# Patient Record
Sex: MALE | Race: WHITE | NOT HISPANIC OR LATINO | Employment: FULL TIME | URBAN - METROPOLITAN AREA
[De-identification: names, ages, dates, MRNs, and addresses within clinical notes are randomized per-mention and may not be internally consistent; named-entity substitution may affect disease eponyms.]

---

## 2023-11-03 DIAGNOSIS — M79.645 THUMB PAIN, LEFT: ICD-10-CM

## 2023-11-06 PROBLEM — R36.9 PENILE DISCHARGE: Status: ACTIVE | Noted: 2023-11-06

## 2023-11-06 PROBLEM — M25.562 LEFT KNEE PAIN: Status: ACTIVE | Noted: 2023-11-06

## 2023-11-07 ENCOUNTER — ANCILLARY PROCEDURE (OUTPATIENT)
Dept: RADIOLOGY | Facility: CLINIC | Age: 28
End: 2023-11-07
Payer: COMMERCIAL

## 2023-11-07 ENCOUNTER — OFFICE VISIT (OUTPATIENT)
Dept: ORTHOPEDIC SURGERY | Facility: CLINIC | Age: 28
End: 2023-11-07
Payer: COMMERCIAL

## 2023-11-07 VITALS — BODY MASS INDEX: 22.46 KG/M2 | WEIGHT: 175 LBS | HEIGHT: 74 IN

## 2023-11-07 DIAGNOSIS — M79.645 THUMB PAIN, LEFT: ICD-10-CM

## 2023-11-07 DIAGNOSIS — S69.92XD THUMB INJURY, LEFT, SUBSEQUENT ENCOUNTER: ICD-10-CM

## 2023-11-07 PROCEDURE — 1036F TOBACCO NON-USER: CPT | Performed by: ORTHOPAEDIC SURGERY

## 2023-11-07 PROCEDURE — 73140 X-RAY EXAM OF FINGER(S): CPT | Mod: LEFT SIDE | Performed by: RADIOLOGY

## 2023-11-07 PROCEDURE — 73140 X-RAY EXAM OF FINGER(S): CPT | Mod: LT

## 2023-11-07 PROCEDURE — 99203 OFFICE O/P NEW LOW 30 MIN: CPT | Performed by: ORTHOPAEDIC SURGERY

## 2023-11-07 ASSESSMENT — PAIN - FUNCTIONAL ASSESSMENT: PAIN_FUNCTIONAL_ASSESSMENT: NO/DENIES PAIN

## 2023-11-08 NOTE — PROGRESS NOTES
History of Present Illness:  Chief Complaint   Patient presents with    Left Hand - Pain     Lt thumb pain- started end of August after a rugby ball hit thumb during a match. Patient continued to play out the season. In September decided to have the injury evaluated at [facility], had x-rays done that showed bone erosion. Experiences pain with certain movements and has a lack of ROM       Patient presents today for evaluation of persistent left thumb pain that began in August 2023.  He had swelling about the MCP joint and has also noticed decreased motion into his left thumb with associated pain.  Pain is worse with gripping and pinching.  Somewhat better with rest.    History reviewed. No pertinent past medical history.    Medication Documentation Review Audit       Reviewed by Lora De La Vega CMA (Medical Assistant) on 11/07/23 at 0919      Medication Order Taking? Sig Documenting Provider Last Dose Status            No Medications to Display                                   No Known Allergies    Social History     Socioeconomic History    Marital status: Single     Spouse name: Not on file    Number of children: Not on file    Years of education: Not on file    Highest education level: Not on file   Occupational History    Not on file   Tobacco Use    Smoking status: Never    Smokeless tobacco: Never   Vaping Use    Vaping Use: Never used   Substance and Sexual Activity    Alcohol use: Yes     Alcohol/week: 6.0 standard drinks of alcohol     Types: 3 Cans of beer, 3 Shots of liquor per week    Drug use: Never    Sexual activity: Defer   Other Topics Concern    Not on file   Social History Narrative    Not on file     Social Determinants of Health     Financial Resource Strain: Not on file   Food Insecurity: Not on file   Transportation Needs: Not on file   Physical Activity: Not on file   Stress: Not on file   Social Connections: Not on file   Intimate Partner Violence: Not on file   Housing Stability:  Not on file       History reviewed. No pertinent surgical history.     Review of Systems   GENERAL: Negative for malaise, significant weight loss, fever  MUSCULOSKELETAL: see HPI  NEURO:  Negative     Physical Examination  Constitutional: Appears well-developed and well-nourished.  Head: Normocephalic and atraumatic.  Eyes: EOMI grossly  Cardiovascular: Intact distal pulses.   Respiratory: Effort normal. No respiratory distress.  Neurologic: Alert and oriented to person, place, and time.  Skin: Skin is warm and dry.  Hematologic / Lymphatic: No lymphedema, lymphangitis.  Psychiatric: normal mood and affect. Behavior is normal.   Musculoskeletal:  Left thumb: Mild edema about MCP joint compared to contralateral side.  5-45 degrees active flexion at MCP joint.  There is a firm endpoint with testing of radial and ulnar collateral ligaments, but pain with stressing of the radial collateral ligament as well as tenderness about radial aspect of MCP joint.  Sensation grossly intact throughout distally.  Capillary refill less than 2 seconds.    Radiographs: Left thumb radiographs ordered and available for my review/interpretation demonstrates slight ulnar deviation of the thumb at the MCP joint.  There is bony irregularity about the radial collateral ligament origin on the metacarpal head.     Assessment:  Patient with left thumb sprain with concern for radial collateral ligament injury     Plan:  Nature of the diagnosis was discussed with the patient.  Patient seems to have good stability and injury may be amenable to splinting to allow for additional healing.  We discussed potential for persistent symptoms despite conservative treatment and possible utility of operative intervention for radial collateral ligament stabilization.  Recommend use of thumb spica splint and left hand MRI has been ordered for further evaluation of collateral ligament status.  Plan for follow-up after MRI completion.

## 2024-01-11 ENCOUNTER — HOSPITAL ENCOUNTER (OUTPATIENT)
Dept: RADIOLOGY | Facility: CLINIC | Age: 29
Discharge: HOME | End: 2024-01-11
Payer: COMMERCIAL

## 2024-01-11 DIAGNOSIS — S69.92XD THUMB INJURY, LEFT, SUBSEQUENT ENCOUNTER: ICD-10-CM

## 2024-01-11 PROCEDURE — 73218 MRI UPPER EXTREMITY W/O DYE: CPT | Mod: LT

## 2024-01-11 PROCEDURE — 73218 MRI UPPER EXTREMITY W/O DYE: CPT | Mod: LEFT SIDE | Performed by: STUDENT IN AN ORGANIZED HEALTH CARE EDUCATION/TRAINING PROGRAM

## 2024-02-13 ENCOUNTER — OFFICE VISIT (OUTPATIENT)
Dept: ORTHOPEDIC SURGERY | Facility: CLINIC | Age: 29
End: 2024-02-13
Payer: COMMERCIAL

## 2024-02-13 DIAGNOSIS — S69.92XD THUMB INJURY, LEFT, SUBSEQUENT ENCOUNTER: Primary | ICD-10-CM

## 2024-02-13 PROCEDURE — 1036F TOBACCO NON-USER: CPT | Performed by: ORTHOPAEDIC SURGERY

## 2024-02-13 PROCEDURE — 99213 OFFICE O/P EST LOW 20 MIN: CPT | Performed by: ORTHOPAEDIC SURGERY

## 2024-02-13 ASSESSMENT — PAIN - FUNCTIONAL ASSESSMENT: PAIN_FUNCTIONAL_ASSESSMENT: NO/DENIES PAIN

## 2024-02-13 NOTE — PROGRESS NOTES
History of Present Illness:  Chief Complaint   Patient presents with    Left Hand - Follow-up     MRI review      Patient presents for repeat evaluation of persistent left thumb pain.  As previously noted, symptoms began in August 2023.  He continues to have intermittent soreness about the MCP joint that is worse with gripping and pinching as well as other heavier activities.  Symptoms are somewhat better with rest, but recur as soon as he becomes more active.  Recently completed MRI.      History reviewed. No pertinent past medical history.    Medication Documentation Review Audit       Reviewed by Lora De La Vega CMA (Medical Assistant) on 02/13/24 at 1139      Medication Order Taking? Sig Documenting Provider Last Dose Status            No Medications to Display                                   No Known Allergies    Social History     Socioeconomic History    Marital status: Single     Spouse name: Not on file    Number of children: Not on file    Years of education: Not on file    Highest education level: Not on file   Occupational History    Not on file   Tobacco Use    Smoking status: Never    Smokeless tobacco: Never   Vaping Use    Vaping Use: Never used   Substance and Sexual Activity    Alcohol use: Yes     Alcohol/week: 6.0 standard drinks of alcohol     Types: 3 Cans of beer, 3 Shots of liquor per week    Drug use: Never    Sexual activity: Defer   Other Topics Concern    Not on file   Social History Narrative    Not on file     Social Determinants of Health     Financial Resource Strain: Not on file   Food Insecurity: Not on file   Transportation Needs: Not on file   Physical Activity: Not on file   Stress: Not on file   Social Connections: Not on file   Intimate Partner Violence: Not on file   Housing Stability: Not on file       History reviewed. No pertinent surgical history.     Review of Systems   GENERAL: Negative for malaise, significant weight loss, fever  MUSCULOSKELETAL: see HPI  NEURO:   Negative     Physical Examination  Constitutional: Appears well-developed and well-nourished.  Head: Normocephalic and atraumatic.  Eyes: EOMI grossly  Cardiovascular: Intact distal pulses.   Respiratory: Effort normal. No respiratory distress.  Neurologic: Alert and oriented to person, place, and time.  Skin: Skin is warm and dry.  Hematologic / Lymphatic: No lymphedema, lymphangitis.  Psychiatric: normal mood and affect. Behavior is normal.   Musculoskeletal:  Left thumb: Mild edema about MCP joint compared to contralateral side.  5-45 degrees active flexion at MCP joint.  There is a firm endpoint with testing of radial and ulnar collateral ligaments.  Slightly increased laxity (~5-10 degrees) compared to contralateral side.  Continued pain with stressing of the radial collateral ligament as well as tenderness about radial aspect of MCP joint.  Sensation grossly intact throughout distally.  Capillary refill less than 2 seconds.    Radiographs: Left hand MRI from January 11, 2024 available for review demonstrates fluid signal intensity underlying metacarpal attachment of radial collateral ligament.  There is also some marrow edema in the first metacarpal head secondary to avulsion fracture.     Assessment:  Patient with persistent left thumb symptoms secondary to radial collateral ligament avulsion injury.  MRI does show some residual edema about avulsion fracture.     Plan:  We reviewed MRI findings as well as risks and benefits of continued nonoperative versus operative treatment options.  Patient continues to have symptoms despite 6 months of healing.  While it is possible symptoms may gradually improve on their own, we did discuss that persistent symptoms may indicate some degree of nonhealing and continued instability of the collateral ligament insertion/avulsion fragment.  We discussed risks and benefits of continued nonoperative versus operative treatment options including expected recovery course.  After  discussing, he would like to further review with his family and will call the office if he wishes to pursue surgical intervention.  Questions addressed.    If patient does wish to pursue surgery, case would be booked as follows: Left thumb radial collateral ligament repair with Arthrex DX swivel lock anchors, general anesthesia.

## 2024-02-20 ENCOUNTER — TELEPHONE (OUTPATIENT)
Dept: ORTHOPEDIC SURGERY | Facility: CLINIC | Age: 29
End: 2024-02-20
Payer: COMMERCIAL

## 2024-02-20 ENCOUNTER — PREP FOR PROCEDURE (OUTPATIENT)
Dept: ORTHOPEDIC SURGERY | Facility: CLINIC | Age: 29
End: 2024-02-20
Payer: COMMERCIAL

## 2024-02-20 DIAGNOSIS — S63.682A: Primary | ICD-10-CM

## 2024-03-08 DIAGNOSIS — S63.642A RUPTURE OF RADIAL COLLATERAL LIGAMENT OF LEFT THUMB, INITIAL ENCOUNTER: ICD-10-CM

## 2024-03-14 ENCOUNTER — ANESTHESIA EVENT (OUTPATIENT)
Dept: OPERATING ROOM | Facility: HOSPITAL | Age: 29
End: 2024-03-14
Payer: COMMERCIAL

## 2024-03-15 ENCOUNTER — PHARMACY VISIT (OUTPATIENT)
Dept: PHARMACY | Facility: CLINIC | Age: 29
End: 2024-03-15

## 2024-03-15 ENCOUNTER — ANESTHESIA (OUTPATIENT)
Dept: OPERATING ROOM | Facility: HOSPITAL | Age: 29
End: 2024-03-15
Payer: COMMERCIAL

## 2024-03-15 ENCOUNTER — HOSPITAL ENCOUNTER (OUTPATIENT)
Facility: HOSPITAL | Age: 29
Setting detail: OUTPATIENT SURGERY
Discharge: HOME | End: 2024-03-15
Attending: ORTHOPAEDIC SURGERY | Admitting: ORTHOPAEDIC SURGERY
Payer: COMMERCIAL

## 2024-03-15 VITALS
OXYGEN SATURATION: 98 % | SYSTOLIC BLOOD PRESSURE: 124 MMHG | HEART RATE: 59 BPM | TEMPERATURE: 97.3 F | DIASTOLIC BLOOD PRESSURE: 81 MMHG | RESPIRATION RATE: 17 BRPM

## 2024-03-15 DIAGNOSIS — S63.682A: Primary | ICD-10-CM

## 2024-03-15 PROCEDURE — C1713 ANCHOR/SCREW BN/BN,TIS/BN: HCPCS | Performed by: ORTHOPAEDIC SURGERY

## 2024-03-15 PROCEDURE — 3700000002 HC GENERAL ANESTHESIA TIME - EACH INCREMENTAL 1 MINUTE: Performed by: ORTHOPAEDIC SURGERY

## 2024-03-15 PROCEDURE — A26540 PR FIX COLLAT LIG,MC-P JT,I-P JT: Performed by: NURSE ANESTHETIST, CERTIFIED REGISTERED

## 2024-03-15 PROCEDURE — 3600000009 HC OR TIME - EACH INCREMENTAL 1 MINUTE - PROCEDURE LEVEL FOUR: Performed by: ORTHOPAEDIC SURGERY

## 2024-03-15 PROCEDURE — 2500000004 HC RX 250 GENERAL PHARMACY W/ HCPCS (ALT 636 FOR OP/ED): Performed by: ORTHOPAEDIC SURGERY

## 2024-03-15 PROCEDURE — 2500000004 HC RX 250 GENERAL PHARMACY W/ HCPCS (ALT 636 FOR OP/ED): Performed by: ANESTHESIOLOGY

## 2024-03-15 PROCEDURE — 7100000009 HC PHASE TWO TIME - INITIAL BASE CHARGE: Performed by: ORTHOPAEDIC SURGERY

## 2024-03-15 PROCEDURE — 7100000010 HC PHASE TWO TIME - EACH INCREMENTAL 1 MINUTE: Performed by: ORTHOPAEDIC SURGERY

## 2024-03-15 PROCEDURE — 26540 REPAIR HAND JOINT: CPT | Performed by: ORTHOPAEDIC SURGERY

## 2024-03-15 PROCEDURE — 2500000004 HC RX 250 GENERAL PHARMACY W/ HCPCS (ALT 636 FOR OP/ED): Performed by: NURSE ANESTHETIST, CERTIFIED REGISTERED

## 2024-03-15 PROCEDURE — 7100000001 HC RECOVERY ROOM TIME - INITIAL BASE CHARGE: Performed by: ORTHOPAEDIC SURGERY

## 2024-03-15 PROCEDURE — 2500000005 HC RX 250 GENERAL PHARMACY W/O HCPCS: Performed by: NURSE ANESTHETIST, CERTIFIED REGISTERED

## 2024-03-15 PROCEDURE — 2780000003 HC OR 278 NO HCPCS: Performed by: ORTHOPAEDIC SURGERY

## 2024-03-15 PROCEDURE — 7100000002 HC RECOVERY ROOM TIME - EACH INCREMENTAL 1 MINUTE: Performed by: ORTHOPAEDIC SURGERY

## 2024-03-15 PROCEDURE — 3700000001 HC GENERAL ANESTHESIA TIME - INITIAL BASE CHARGE: Performed by: ORTHOPAEDIC SURGERY

## 2024-03-15 PROCEDURE — 3600000004 HC OR TIME - INITIAL BASE CHARGE - PROCEDURE LEVEL FOUR: Performed by: ORTHOPAEDIC SURGERY

## 2024-03-15 PROCEDURE — RXMED WILLOW AMBULATORY MEDICATION CHARGE

## 2024-03-15 DEVICE — H/W INTERNALBRACE LGMNT AUGMNT REPR KIT
Type: IMPLANTABLE DEVICE | Site: THUMB | Status: FUNCTIONAL
Brand: ARTHREX®

## 2024-03-15 RX ORDER — DROPERIDOL 2.5 MG/ML
0.62 INJECTION, SOLUTION INTRAMUSCULAR; INTRAVENOUS ONCE AS NEEDED
Status: DISCONTINUED | OUTPATIENT
Start: 2024-03-15 | End: 2024-03-15 | Stop reason: HOSPADM

## 2024-03-15 RX ORDER — CEFAZOLIN 1 G/1
INJECTION, POWDER, FOR SOLUTION INTRAVENOUS AS NEEDED
Status: DISCONTINUED | OUTPATIENT
Start: 2024-03-15 | End: 2024-03-15

## 2024-03-15 RX ORDER — ONDANSETRON HYDROCHLORIDE 2 MG/ML
INJECTION, SOLUTION INTRAVENOUS AS NEEDED
Status: DISCONTINUED | OUTPATIENT
Start: 2024-03-15 | End: 2024-03-15

## 2024-03-15 RX ORDER — FENTANYL CITRATE 50 UG/ML
INJECTION, SOLUTION INTRAMUSCULAR; INTRAVENOUS AS NEEDED
Status: DISCONTINUED | OUTPATIENT
Start: 2024-03-15 | End: 2024-03-15

## 2024-03-15 RX ORDER — SODIUM CHLORIDE, SODIUM LACTATE, POTASSIUM CHLORIDE, CALCIUM CHLORIDE 600; 310; 30; 20 MG/100ML; MG/100ML; MG/100ML; MG/100ML
100 INJECTION, SOLUTION INTRAVENOUS CONTINUOUS
Status: DISCONTINUED | OUTPATIENT
Start: 2024-03-15 | End: 2024-03-15 | Stop reason: HOSPADM

## 2024-03-15 RX ORDER — BUPIVACAINE HYDROCHLORIDE 5 MG/ML
INJECTION, SOLUTION EPIDURAL; INTRACAUDAL AS NEEDED
Status: DISCONTINUED | OUTPATIENT
Start: 2024-03-15 | End: 2024-03-15 | Stop reason: HOSPADM

## 2024-03-15 RX ORDER — LIDOCAINE HCL/PF 100 MG/5ML
SYRINGE (ML) INTRAVENOUS AS NEEDED
Status: DISCONTINUED | OUTPATIENT
Start: 2024-03-15 | End: 2024-03-15

## 2024-03-15 RX ORDER — HYDROCODONE BITARTRATE AND ACETAMINOPHEN 5; 325 MG/1; MG/1
1 TABLET ORAL EVERY 6 HOURS PRN
Qty: 12 TABLET | Refills: 0 | Status: SHIPPED | OUTPATIENT
Start: 2024-03-15 | End: 2024-03-20

## 2024-03-15 RX ORDER — OXYCODONE HYDROCHLORIDE 5 MG/1
5 TABLET ORAL EVERY 4 HOURS PRN
Status: DISCONTINUED | OUTPATIENT
Start: 2024-03-15 | End: 2024-03-15 | Stop reason: HOSPADM

## 2024-03-15 RX ORDER — MIDAZOLAM HYDROCHLORIDE 1 MG/ML
INJECTION INTRAMUSCULAR; INTRAVENOUS AS NEEDED
Status: DISCONTINUED | OUTPATIENT
Start: 2024-03-15 | End: 2024-03-15

## 2024-03-15 RX ORDER — ONDANSETRON HYDROCHLORIDE 2 MG/ML
4 INJECTION, SOLUTION INTRAVENOUS ONCE AS NEEDED
Status: DISCONTINUED | OUTPATIENT
Start: 2024-03-15 | End: 2024-03-15 | Stop reason: HOSPADM

## 2024-03-15 RX ORDER — PROPOFOL 10 MG/ML
INJECTION, EMULSION INTRAVENOUS AS NEEDED
Status: DISCONTINUED | OUTPATIENT
Start: 2024-03-15 | End: 2024-03-15

## 2024-03-15 RX ADMIN — PROPOFOL 200 MG: 10 INJECTION, EMULSION INTRAVENOUS at 08:32

## 2024-03-15 RX ADMIN — FENTANYL CITRATE 50 MCG: 50 INJECTION, SOLUTION INTRAMUSCULAR; INTRAVENOUS at 08:58

## 2024-03-15 RX ADMIN — ONDANSETRON 4 MG: 2 INJECTION INTRAMUSCULAR; INTRAVENOUS at 08:44

## 2024-03-15 RX ADMIN — FENTANYL CITRATE 50 MCG: 50 INJECTION, SOLUTION INTRAMUSCULAR; INTRAVENOUS at 08:28

## 2024-03-15 RX ADMIN — CEFAZOLIN 2 G: 330 INJECTION, POWDER, FOR SOLUTION INTRAMUSCULAR; INTRAVENOUS at 08:36

## 2024-03-15 RX ADMIN — LIDOCAINE HYDROCHLORIDE 80 MG: 20 INJECTION INTRAVENOUS at 08:32

## 2024-03-15 RX ADMIN — DEXAMETHASONE SODIUM PHOSPHATE 8 MG: 4 INJECTION INTRA-ARTICULAR; INTRALESIONAL; INTRAMUSCULAR; INTRAVENOUS; SOFT TISSUE at 08:44

## 2024-03-15 RX ADMIN — MIDAZOLAM HYDROCHLORIDE 2 MG: 1 INJECTION, SOLUTION INTRAMUSCULAR; INTRAVENOUS at 08:28

## 2024-03-15 RX ADMIN — SODIUM CHLORIDE, POTASSIUM CHLORIDE, SODIUM LACTATE AND CALCIUM CHLORIDE 100 ML/HR: 600; 310; 30; 20 INJECTION, SOLUTION INTRAVENOUS at 07:56

## 2024-03-15 SDOH — HEALTH STABILITY: MENTAL HEALTH: CURRENT SMOKER: 0

## 2024-03-15 ASSESSMENT — PAIN SCALES - GENERAL
PAINLEVEL_OUTOF10: 0 - NO PAIN
PAINLEVEL_OUTOF10: 0 - NO PAIN
PAINLEVEL_OUTOF10: 2
PAINLEVEL_OUTOF10: 0 - NO PAIN

## 2024-03-15 ASSESSMENT — PAIN - FUNCTIONAL ASSESSMENT: PAIN_FUNCTIONAL_ASSESSMENT: 0-10

## 2024-03-15 NOTE — DISCHARGE INSTRUCTIONS
Dr. Chavez Post-Operative Instructions  Hand/Wrist/Elbow    Activity:  Rest on the day of surgery.  Gradually resume your regular diet, beginning with clear liquids and progressing as you feel ready.  No driving if you had anesthesia.    Anesthesia:  You may feel dizzy, sleepy or lightheaded for up to 24 hours after surgery.  If you had general anesthesia you may have a sore throat for 1-2 days.  If you had a nerve block wear a sling until nerve function returns for your safety.  Nerve block may last 18-36 hours.    Post-Operative Medications:  You may resume your regular medications (including blood thinners if you stopped them).  You have been given a prescription for pain medication as needed.  You may also take over-the-counter anti-inflammatories and/or Tylenol for pain relief.  If you are taking the prescribed pain medication you must limit additional Tylenol (acetaminophen) intake to avoid overdose.    Dressings:  Keep your splint clean and dry.  You may cover with a plastic bag or cast cover and seal bag to your skin above the bandage for showering.  If your dressing becomes wet or significantly bloodstained call the office at [phone].    Post-Operative Care:  Keep the surgical site elevated above the level of your heart to limit swelling.  If your fingers are not included in the dressing you are encouraged to move your fingers regularly (full fist and full extension).  Regularly ice the surgical site.  You may also apply ice pack above the level of the dressing and this will cool the blood as it travels towards the surgical site.    Call Surgeon/Office at any time for:           Office Number: [phone]  Excessive bleeding  Loss of feeling (The local numbing medicine from surgery typically lasts 8-12 hours.  Nerve blocks can last 18-36 hours.)  Tight dressing: Make sure that you are elevating the operative site appropriately.  If no relief then call the office.                                                                                                         Circulation issues:  If fingers change to white or blue  Concerns/Problems with your surgery

## 2024-03-15 NOTE — ANESTHESIA PREPROCEDURE EVALUATION
Patient: Sebastian Dunham    Procedure Information       Anesthesia Start Date/Time: 03/15/24 0828    Procedure: LEFT THUMB RADIAL COLLATERAL LIGAMENT RECONSTRUCTION (Left: Hand)    Location: [facility] / [facility]    Surgeons: Golden Chavez MD            Relevant Problems   No relevant active problems       Clinical information reviewed:   Tobacco  Allergies  Meds   Med Hx  Surg Hx   Fam Hx  Soc Hx        NPO Detail:  NPO/Void Status  Date of Last Liquid: 03/14/24  Time of Last Liquid: 2300  Date of Last Solid: 03/14/24  Time of Last Solid: 2130         Physical Exam    Airway  Mallampati: II  TM distance: >3 FB  Neck ROM: full     Cardiovascular    Dental    Pulmonary - normal exam     Abdominal - normal exam         Anesthesia Plan    History of general anesthesia?: yes  History of complications of general anesthesia?: no    ASA 2     general     The patient is not a current smoker.    Anesthetic plan and risks discussed with patient.  Use of blood products discussed with patient who.    Plan discussed with CRNA and attending.

## 2024-03-15 NOTE — OP NOTE
Pre-Operative Diagnosis: Left thumb radial collateral ligament injury  Post-Operative Diagnosis: same  Procedure: Left thumb radial collateral ligament repair  Surgeon: Kathy  Assistant: Pinky  Anesthesia: General  Complications: none  Estimated blood loss: Minimal  Specimen: None  Implants:  Implant Name Type Inv. Item Serial No.  Lot No. LRB No. Used Action   REPAIR KIT, LIGAMENT AUGMENTATION, HAND/WRIST INTERNABRACE - [id number] Implant REPAIR KIT, LIGAMENT AUGMENTATION, HAND/WRIST INTERNABRACE  [employer] [id number] Left 1 Implanted     Findings: See below  Disposition: Good/PACU      Indications: Patient with left thumb radial collateral ligament injury that has continued to remain symptomatic.  We discussed risks and benefits of nonoperative versus operative treatment options and after thoroughly reviewing patient wishes to proceed with collateral ligament repair.  Expected operative and postoperative course was reviewed and questions addressed.    Operative course: Patient was greeted in the preoperative holding area and the operative site was marked with indelible marker.  Patient was brought back to the operating room suite where general anesthetic was induced by the anesthesia team.  Left upper extremity was prepped and draped in standard sterile fashion timeout procedure was performed as per standard protocol.  Esmarch was used to exsanguinate left upper extremity and upper arm tourniquet was inflated.  Incision was made longitudinally overlying the dorsal/radial aspect of the thumb MCP joint.  Gentle spreading was carried down through the subcutaneous tissues and a branch of the dorsal radial sensory nerve was identified and gently freed from surrounding tissues and carefully protected with gentle blunt retraction throughout the remainder of the case.  The tendon aponeurosis was sharply divided and debrided from the capsule/radial collateral ligament.  The radial collateral ligament was then  carefully dissected/defined to allow for reattachment with more appropriate tensioning.  The joint was inspected and there was no significant cartilage damage.  K wires were then inserted into the origin and insertion of the radial collateral ligament while continuing to protect the radial sensory nerve branch.  The K wire in the proximal phalanx was then overdrilled using soft tissue protector and an anchor with 4-0 FiberWire as well as a suture tape was inserted.  While holding the thumb in a reduced position in approximately 30 degrees flexion the ligament was repaired using a 4-0 FiberWire suture.  The dorsal edge of the collateral ligament was also repaired to the dorsal joint capsule.  The thumb had excellent stability and continue to have good flexion and extension.  The K wire in the metacarpal head region was then overdrilled and an anchor was inserted with the fiber tape reinforcing the radial collateral ligament repair.  This was performed while continuing to hold the MCP joint in a reduced position in slight flexion.  Following insertion of the second anchor the MCP joint continue to have good flexion extension as well as significantly improved stability to radial/ulnar stress.  The wound was then copiously irrigated and the aponeurosis was repaired with 4-0 FiberWire in a running fashion while continuing to protect the nearby sensory nerve.  Skin was reapproximated with 4-0 Monocryl in a buried interrupted fashion followed by Exofin mesh on the skin.  Dry sterile dressings were applied after Marcaine was infiltrated for postoperative analgesic relief.  Patient was placed into a well-padded thumb spica splint and awoken from anesthesia uneventfully.    Patient is scheduled to follow-up with therapy next week for transition into forearm-based thumb spica brace.  Okay to begin immediate active range of motion while avoiding weightbearing/gripping/pinching with the thumb.  He will follow-up with me in  approximately 2 weeks for wound check.

## 2024-03-15 NOTE — ANESTHESIA PROCEDURE NOTES
Airway  Date/Time: 3/15/2024 8:33 AM  Urgency: elective    Airway not difficult    Staffing  Performed: CRNA   Authorized by: SVITLANA Youngblood    Performed by: SVITLANA Youngblood  Patient location during procedure: OR    Indications and Patient Condition  Indications for airway management: anesthesia  Spontaneous Ventilation: absent  Sedation level: deep  Preoxygenated: yes  Patient position: sniffing  Mask difficulty assessment: 1 - vent by mask    Final Airway Details  Final airway type: supraglottic airway      Successful airway: Size 5     Number of attempts at approach: 1  Number of other approaches attempted: 0

## 2024-03-19 ENCOUNTER — EVALUATION (OUTPATIENT)
Dept: OCCUPATIONAL THERAPY | Facility: HOSPITAL | Age: 29
End: 2024-03-19
Payer: COMMERCIAL

## 2024-03-19 DIAGNOSIS — S63.642D RUPTURE OF RADIAL COLLATERAL LIGAMENT OF LEFT THUMB, SUBSEQUENT ENCOUNTER: Primary | ICD-10-CM

## 2024-03-19 PROBLEM — S63.642A RUPTURE OF RADIAL COLLATERAL LIGAMENT OF LEFT THUMB: Status: ACTIVE | Noted: 2024-03-19

## 2024-03-19 PROCEDURE — 97165 OT EVAL LOW COMPLEX 30 MIN: CPT | Mod: GO | Performed by: OCCUPATIONAL THERAPIST

## 2024-03-19 PROCEDURE — L3808 WHFO, RIGID W/O JOINTS: HCPCS | Performed by: OCCUPATIONAL THERAPIST

## 2024-03-19 PROCEDURE — 97110 THERAPEUTIC EXERCISES: CPT | Mod: GO | Performed by: OCCUPATIONAL THERAPIST

## 2024-03-19 ASSESSMENT — PAIN - FUNCTIONAL ASSESSMENT: PAIN_FUNCTIONAL_ASSESSMENT: 0-10

## 2024-03-19 ASSESSMENT — PAIN SCALES - GENERAL: PAINLEVEL_OUTOF10: 1

## 2024-03-19 NOTE — PROGRESS NOTES
Occupational Therapy  Occupational Therapy Orthopedic Evaluation    Patient Name: Sebastian Dunham  MRN: [id number]  Today's Date: 3/19/2024  Time Calculation  Start Time: 1100  Stop Time: 1150  Time Calculation (min): 50 min    Insurance:  Visit number: 1 of 100  Insurance Type: [employer]    General:  Reason for visit: s/p L thumb RCL repair  Referred by: Dr Chavez    Current Problem  1. Rupture of radial collateral ligament of left thumb, subsequent encounter  Referral to Occupational Therapy          Precautions: No WB, light ROM allowed       Medical History Form: Reviewed (scanned into chart)    Subjective:   Chief Complaint: L thumb  Onset: 8/2023  DINORAH: rugby  DOS: 3/15/24      Hand Dominance: Right    Current Condition since injury:   unknown    PAIN  Pain Assessment: 0-10  Pain Score: 1  Location: L thumb  Description: sore      Prior Level of Function (PLOF)  Exercise/Physical Activity: rugby, running  Work/School: [employer]   Current ADL/IADL Status: Mod I     Patients Living Environment: Reviewed and no concern    Primary Language: English    Pt goals for therapy: return to full activity    Red Flags: Do you have any of the following? No  Fever/chills, unexplained weight changes, dizziness/fainting, unexplained change in bowel or bladder functions, unexplained malaise or muscle weakness, night pain/sweats, numbness or tingling    Objective:    Right Hand AROM (degrees)   MCP PIP DIP [place]   Thumb 55  55 Base of SF   Thumb RABD 55      Thumb PABD 55        Left Hand AROM (degrees)   MCP PIP DIP [place]   Thumb 30  10 To SF tip   Thumb RABD 45      Thumb PABD 45          Edema: mild throughout thumb    Sensory: intact light touch  Numbness/Tingling: none reported    Outcome Measures:  DASH: 47.73    EDUCATION: home exercise program, plan of care, activity modifications, pain management, and injury pathology       Goals:  Active       OT Goals       OT Goal 1       Start:  03/19/24    Expected End:   06/25/24       Pt will be able to catch/play rugby without increase in pain or difficulty using L hand in 16 weeks         OT Goal 2       Start:  03/19/24    Expected End:  05/14/24       Pt will increase L thumb opposition to base of SF in 8 weeks         OT Goal 3       Start:  03/19/24    Expected End:  04/30/24       Pt will report an overall increase in function evidenced by a decrease in DASH score of 20 pts in 6 weeks         OT Goal 4       Start:  03/19/24    Expected End:  03/20/24       Pt/family will verbalize and/or demonstrate understanding of diagnosis and precautions by end of session  Pt/family will verbalize purpose of orthosis, wearing schedule, and care/precautions by end of session  Pt/family will don/doff orthosis correctly and independently by end of session  Pt will verbalize/demonstrate home program and activity modification recommendations by end of session                  Plan of care was developed with input and agreement by the patient    Treatments:       Therapeutic Exercise:   15 min  HEP discussed - AROM wrist flex/ext, RD/UD, pro/sup, thumb flex/ext, RABD/PABD, opposition, gentle MCP/IP blocking       Orthosis:      15 min  Fabricated long arm thumb spica orthosis with IP free        Assessment: Patient is a 27 yo male  s/p L thumb RCL repair resulting in limited participation in pain-free ADLs and inability to perform at their prior level of function. Pt would benefit from occupational therapy to address the impairments found & listed previously in the objective section in order to return to safe and pain-free ADLs and prior level of function.       Plan:    Planned Interventions include: therapeutic exercise, therapeutic activity, self-care home management, manual therapy, therapeutic activities, gait training, neuromuscular coordination, vasopneumatic, dry needling, aquatic therapy, electric stimulation, fluidotherapy, ultrasound, kinesiotaping, orthosis fabrication, wound  care  Frequency: 1-2 x Week  Duration: 12 Weeks    Sanket Baker, OT

## 2024-03-21 ASSESSMENT — PAIN SCALES - GENERAL: PAIN_LEVEL: 2

## 2024-03-21 NOTE — ANESTHESIA POSTPROCEDURE EVALUATION
Patient: Sebastian Dunham    Procedure Summary       Date: 03/15/24 Room / Location: [facility] / [facility]    Anesthesia Start: 0828 Anesthesia Stop: 0946    Procedure: LEFT THUMB RADIAL COLLATERAL LIGAMENT RECONSTRUCTION (Left: Hand) Diagnosis:       Traumatic rupture of collateral ligament of left thumb      (Traumatic rupture of collateral ligament of left thumb [S63.682A])    Surgeons: Golden Chavez MD Responsible Provider: SVITLANA Youngblood    Anesthesia Type: general ASA Status: 2            Anesthesia Type: general    Vitals Value Taken Time   /76 03/15/24 1014   Temp 36.3 °C (97.3 °F) 03/15/24 0944   Pulse 69 03/15/24 1014   Resp 14 03/15/24 1014   SpO2 97 % 03/15/24 1014       Anesthesia Post Evaluation    Patient location during evaluation: PACU  Patient participation: complete - patient participated  Level of consciousness: awake  Pain score: 2  Pain management: adequate  Multimodal analgesia pain management approach  Airway patency: patent  Two or more strategies used to mitigate risk of obstructive sleep apnea  Cardiovascular status: acceptable  Respiratory status: acceptable  Hydration status: acceptable  Postoperative Nausea and Vomiting: none    No notable events documented.

## 2024-03-27 ENCOUNTER — TREATMENT (OUTPATIENT)
Dept: OCCUPATIONAL THERAPY | Facility: HOSPITAL | Age: 29
End: 2024-03-27
Payer: COMMERCIAL

## 2024-03-27 DIAGNOSIS — S63.642D RUPTURE OF RADIAL COLLATERAL LIGAMENT OF LEFT THUMB, SUBSEQUENT ENCOUNTER: Primary | ICD-10-CM

## 2024-03-27 PROCEDURE — 97035 APP MDLTY 1+ULTRASOUND EA 15: CPT | Mod: GO | Performed by: OCCUPATIONAL THERAPIST

## 2024-03-27 PROCEDURE — 97140 MANUAL THERAPY 1/> REGIONS: CPT | Mod: GO | Performed by: OCCUPATIONAL THERAPIST

## 2024-03-27 ASSESSMENT — PAIN - FUNCTIONAL ASSESSMENT: PAIN_FUNCTIONAL_ASSESSMENT: 0-10

## 2024-03-27 ASSESSMENT — PAIN SCALES - GENERAL: PAINLEVEL_OUTOF10: 0 - NO PAIN

## 2024-03-27 NOTE — PROGRESS NOTES
Occupational Therapy  Occupational Therapy Treatment    Patient Name: Sebastian Dunham  MRN: [id number]  Today's Date: 3/27/2024  Time Calculation  Start Time: 0300  Stop Time: 0338  Time Calculation (min): 38 min    Insurance:  Visit number: 2 of 100  Insurance Type: [place]    General:  Reason for visit: s/p L thumb RCL repair  Referred by: Dr Chavez  DOS: 3/15/24    Current Problem  1. Rupture of radial collateral ligament of left thumb, subsequent encounter  Follow Up In Occupational Therapy          Precautions    RCL protocol    Subjective:   Patient reports he has more motion    Performing HEP?: Yes    Pain  Pain Assessment: 0-10  Pain Score: 0 - No pain  Location: L   Description: sore, diminished sensation    Objective:     Right Hand AROM (degrees)    MCP PIP DIP [place]   Thumb 55   55 Base of SF   Thumb RABD 55         Thumb PABD 55            Left Hand AROM (degrees)    MCP PIP DIP [place]   Thumb 44   30 To PIP of SF   Thumb RABD 53         Thumb PABD 55               Edema: mild throughout thumb     Sensory: intact light touch  Numbness/Tingling: pt reports some diminished sensation dorsally/radially on thumb    Treatments:     Modalities:      18 min  X8 min continuous 3MHz @ 1.0w/cm2 to L thumb MPJ   X10 min themx cold/hot compression to L hand/wrist  34 degrees F @ 45 mmHg pressure      Therapeutic Exercise:   5 min  Thumb MP/IP joint blocking 2x10    Manual Therapy:     15 min  STM, light passive CMC/thumb IP joint flex/ext, opposition       Assessment: Pt with improvements in ROM in all planes.  No real pain with motion.  He has been keeping the incision clean.         Plan: Continue with progressive ROM, refrain from starting light /pinch involving thumb until 8-10 weeks post op       Sanket Baker, OT

## 2024-04-02 ENCOUNTER — OFFICE VISIT (OUTPATIENT)
Dept: ORTHOPEDIC SURGERY | Facility: CLINIC | Age: 29
End: 2024-04-02
Payer: COMMERCIAL

## 2024-04-02 DIAGNOSIS — S69.92XD THUMB INJURY, LEFT, SUBSEQUENT ENCOUNTER: Primary | ICD-10-CM

## 2024-04-02 PROCEDURE — 1036F TOBACCO NON-USER: CPT | Performed by: ORTHOPAEDIC SURGERY

## 2024-04-02 PROCEDURE — 99024 POSTOP FOLLOW-UP VISIT: CPT | Performed by: ORTHOPAEDIC SURGERY

## 2024-04-02 ASSESSMENT — PAIN - FUNCTIONAL ASSESSMENT: PAIN_FUNCTIONAL_ASSESSMENT: NO/DENIES PAIN

## 2024-04-03 NOTE — PROGRESS NOTES
History of Present Illness  Chief Complaint   Patient presents with    Left Hand - Post-op     3/15/24 left thumb RCL repair        Patient already working with therapy and regularly using thumb spica Thermoplast splint.  Pain controlled     Examination  Left thumb:  Incision is well healing. No signs of infection.  Sensation grossly intact distally to light touch, but slightly diminished sensation about dorsal/radial aspect of thumb and the 1 cm distal to the incision site.  Capillary refill less than 2 seconds.  Gross stability of MCP joint intact to light stress.     Assessment:  Patient status post left thumb radial collateral ligament repair     Plan  Patient will begin scar tissue massage.  Continue mobilization exercises with guided therapy.  Continue use of Thermoplast splint when not doing exercises and not performing hygiene.  No strengthening until 6 weeks.  Follow-up in 4 weeks for clinical check.

## 2024-04-09 ENCOUNTER — TREATMENT (OUTPATIENT)
Dept: OCCUPATIONAL THERAPY | Facility: HOSPITAL | Age: 29
End: 2024-04-09
Payer: COMMERCIAL

## 2024-04-09 DIAGNOSIS — S63.642D RUPTURE OF RADIAL COLLATERAL LIGAMENT OF LEFT THUMB, SUBSEQUENT ENCOUNTER: Primary | ICD-10-CM

## 2024-04-09 PROCEDURE — 97110 THERAPEUTIC EXERCISES: CPT | Mod: GO | Performed by: OCCUPATIONAL THERAPIST

## 2024-04-09 PROCEDURE — 97035 APP MDLTY 1+ULTRASOUND EA 15: CPT | Mod: GO | Performed by: OCCUPATIONAL THERAPIST

## 2024-04-09 PROCEDURE — 97140 MANUAL THERAPY 1/> REGIONS: CPT | Mod: GO | Performed by: OCCUPATIONAL THERAPIST

## 2024-04-09 ASSESSMENT — PAIN - FUNCTIONAL ASSESSMENT: PAIN_FUNCTIONAL_ASSESSMENT: 0-10

## 2024-04-09 ASSESSMENT — PAIN SCALES - GENERAL: PAINLEVEL_OUTOF10: 0 - NO PAIN

## 2024-04-09 NOTE — PROGRESS NOTES
Occupational Therapy  Occupational Therapy Treatment    Patient Name: Sebastian Dunham  MRN: [id number]  Today's Date: 4/9/2024  Time Calculation  Start Time: 0230  Stop Time: 0318  Time Calculation (min): 48 min    Insurance:  Visit number: 3 of 100  Insurance Type: [place]    General:  Reason for visit: s/p L thumb RCL repair  Referred by: Dr Chavez  DOS: 3/15/24    Current Problem  1. Rupture of radial collateral ligament of left thumb, subsequent encounter            Precautions    RCL protocol    Subjective:   Patient reports he has more motion    Performing HEP?: Yes    Pain  Pain Assessment: 0-10  Pain Score: 0 - No pain  Location: L   Description: sore, diminished sensation    Objective:     Right Hand AROM (degrees)    MCP PIP DIP [place]   Thumb 55   55 Base of SF   Thumb RABD 55         Thumb PABD 55            Left Hand AROM (degrees)    MCP PIP DIP [place]   Thumb 50   32 1 cm from base of SF   Thumb RABD 55         Thumb PABD 55               Edema: mild throughout thumb     Sensory: intact light touch  Numbness/Tingling: pt reports some diminished sensation dorsally/radially on thumb    Treatments:     Modalities:      18 min  X8 min continuous 3MHz @ 1.0w/cm2 to L thumb MPJ   X10 min themx cold compression to L hand/wrist  34 degrees F @ 45 mmHg pressure      Therapeutic Exercise:   15 min  Thumb MP/IP joint blocking x10 each  Yellow powerweb thumbless hook grasp 2x10, digit ab/adduction 2x10  Bead pickup digit opp, palmar translation towards base of SF    Manual Therapy:     15 min  STM, light passive CMC/thumb IP joint flex/ext, opposition       Assessment: Pt increasing ROM all planes.  He can start more manual stretch to increase overall motion and continue to progress.  Plan to modify orthosis to hand based next week and intitate light thumb strengthening  6 weeks post op per MD.       Plan: Continue with progressive ROM    Sanket Baker, OT

## 2024-04-16 ENCOUNTER — TREATMENT (OUTPATIENT)
Dept: OCCUPATIONAL THERAPY | Facility: HOSPITAL | Age: 29
End: 2024-04-16
Payer: COMMERCIAL

## 2024-04-16 DIAGNOSIS — S63.642D RUPTURE OF RADIAL COLLATERAL LIGAMENT OF LEFT THUMB, SUBSEQUENT ENCOUNTER: ICD-10-CM

## 2024-04-16 PROCEDURE — 97035 APP MDLTY 1+ULTRASOUND EA 15: CPT | Mod: GO | Performed by: OCCUPATIONAL THERAPIST

## 2024-04-16 PROCEDURE — 97763 ORTHC/PROSTC MGMT SBSQ ENC: CPT | Mod: GO | Performed by: OCCUPATIONAL THERAPIST

## 2024-04-16 PROCEDURE — 97140 MANUAL THERAPY 1/> REGIONS: CPT | Mod: GO,59 | Performed by: OCCUPATIONAL THERAPIST

## 2024-04-16 ASSESSMENT — PAIN SCALES - GENERAL: PAINLEVEL_OUTOF10: 0 - NO PAIN

## 2024-04-16 ASSESSMENT — PAIN - FUNCTIONAL ASSESSMENT: PAIN_FUNCTIONAL_ASSESSMENT: 0-10

## 2024-04-16 NOTE — PROGRESS NOTES
Occupational Therapy  Occupational Therapy Treatment    Patient Name: Sebastian Dunham  MRN: [id number]  Today's Date: 4/16/2024  Time Calculation  Start Time: 0220  Stop Time: 0300  Time Calculation (min): 40 min    Insurance:  Visit number: 3 of 100  Insurance Type: [place]    General:  Reason for visit: s/p L thumb RCL repair  Referred by: Dr Chavez  DOS: 3/15/24    Current Problem  1. Rupture of radial collateral ligament of left thumb, subsequent encounter  Follow Up In Occupational Therapy          Precautions    RCL protocol    Subjective:   Patient reports he has been working on motion, progressing    Performing HEP?: Yes    Pain  Pain Assessment: 0-10  Pain Score: 0 - No pain  Location: L   Description: sore, diminished sensation    Objective:     Right Hand AROM (degrees)    MCP PIP DIP [place]   Thumb 55   55 Base of SF   Thumb RABD 55         Thumb PABD 55            Left Hand AROM (degrees)    MCP PIP DIP [place]   Thumb 50   44 base of SF   Thumb RABD 55         Thumb PABD 55               Edema: mild throughout thumb     Sensory: intact light touch  Numbness/Tingling: pt reports some diminished sensation dorsally/radially on thumb    Treatments:     Modalities:      8 min  X8 min continuous 3MHz @ 1.0w/cm2 to L thumb MPJ       Orthosis:      16 min  Modified orthosis to hand based to allow for wrist motion      Manual Therapy:     16 min  STM, light passive CMC/thumb IP joint flex/ext, opposition       Assessment: Pt with continued improvement in all motion planes.  Motion is functional however not fully symmetrical with other thumb yet.  He will continue to work on home active and light passive motion to maximize his motion.  Progress to light strengthening in 2 weeks.       Plan: Continue with progressive ROM    Sanket Baker, OT

## 2024-04-23 ENCOUNTER — TREATMENT (OUTPATIENT)
Dept: OCCUPATIONAL THERAPY | Facility: HOSPITAL | Age: 29
End: 2024-04-23
Payer: COMMERCIAL

## 2024-04-23 DIAGNOSIS — S63.642D RUPTURE OF RADIAL COLLATERAL LIGAMENT OF LEFT THUMB, SUBSEQUENT ENCOUNTER: ICD-10-CM

## 2024-04-23 PROCEDURE — 97022 WHIRLPOOL THERAPY: CPT | Mod: GO | Performed by: OCCUPATIONAL THERAPIST

## 2024-04-23 PROCEDURE — 97140 MANUAL THERAPY 1/> REGIONS: CPT | Mod: GO | Performed by: OCCUPATIONAL THERAPIST

## 2024-04-23 PROCEDURE — 97035 APP MDLTY 1+ULTRASOUND EA 15: CPT | Mod: GO | Performed by: OCCUPATIONAL THERAPIST

## 2024-04-23 NOTE — PROGRESS NOTES
Occupational Therapy  Occupational Therapy Treatment    Patient Name: Sebastian Dunham  MRN: [id number]  Today's Date: 4/23/2024  Time Calculation  Start Time: 0230  Stop Time: 0307  Time Calculation (min): 37 min    Insurance:  Visit number: 4 of 100  Insurance Type: [place]    General:  Reason for visit: s/p L thumb RCL repair  Referred by: Dr Chavez  DOS: 3/15/24    Current Problem  1. Rupture of radial collateral ligament of left thumb, subsequent encounter  Follow Up In Occupational Therapy            Precautions    RCL protocol    Subjective:   Patient reports he has been working on motion, progressing    Performing HEP?: Yes    Pain     Location: L   Description: sore, diminished sensation    Objective:     Right Hand AROM (degrees)    MCP PIP DIP [place]   Thumb 55   55 Base of SF   Thumb RABD 55         Thumb PABD 55            Left Hand AROM (degrees)    MCP PIP DIP [place]   Thumb 50   50 base of SF   Thumb RABD 55         Thumb PABD 55               Edema: mild throughout thumb     Sensory: intact light touch  Numbness/Tingling: pt reports some diminished sensation dorsally/radially on thumb    Treatments:     Modalities:      18 min  X8 min continuous 3MHz @ 1.0w/cm2 to L thumb MPJ   X10 min fluiotherapy to L hand, 120 degrees F @ 70% air speed    Therex:    3 min  Rubber band thumb flex, IP flex, thumb ext 2x10 each  Manual Therapy:     16 min  STM, light passive CMC/thumb IP joint flex/ext, opposition       Assessment: Pt with near full motion throughout thumb.  He does have some continued soreness as he uses his hand more as expected.  He can start very light resistance using a rubber band to start activating thumb musculature better, we will plan to advance to theraputty strengthening next week as long as he continues to do well.       Plan: Continue with progressive ROM    Sanket Baker, OT

## 2024-04-24 ENCOUNTER — OFFICE VISIT (OUTPATIENT)
Dept: DERMATOLOGY | Facility: CLINIC | Age: 29
End: 2024-04-24
Payer: COMMERCIAL

## 2024-04-24 DIAGNOSIS — L90.5 SCAR CONDITIONS AND FIBROSIS OF SKIN: ICD-10-CM

## 2024-04-24 DIAGNOSIS — L26 KERATOLYSIS EXFOLIATIVA: ICD-10-CM

## 2024-04-24 DIAGNOSIS — D22.9 MULTIPLE BENIGN MELANOCYTIC NEVI: Primary | ICD-10-CM

## 2024-04-24 PROCEDURE — 1036F TOBACCO NON-USER: CPT | Performed by: DERMATOLOGY

## 2024-04-24 PROCEDURE — 99203 OFFICE O/P NEW LOW 30 MIN: CPT | Performed by: DERMATOLOGY

## 2024-04-24 ASSESSMENT — DERMATOLOGY QUALITY OF LIFE (QOL) ASSESSMENT
ARE THERE EXCLUSIONS OR EXCEPTIONS FOR THE QUALITY OF LIFE ASSESSMENT: NO
RATE HOW BOTHERED YOU ARE BY EFFECTS OF YOUR SKIN PROBLEMS ON YOUR ACTIVITIES (EG, GOING OUT, ACCOMPLISHING WHAT YOU WANT, WORK ACTIVITIES OR YOUR RELATIONSHIPS WITH OTHERS): 0 - NEVER BOTHERED
RATE HOW EMOTIONALLY BOTHERED YOU ARE BY YOUR SKIN PROBLEM (FOR EXAMPLE, WORRY, EMBARRASSMENT, FRUSTRATION): 0 - NEVER BOTHERED
WHAT SINGLE SKIN CONDITION LISTED BELOW IS THE PATIENT ANSWERING THE QUALITY-OF-LIFE ASSESSMENT QUESTIONS ABOUT: NONE OF THE ABOVE
DATE THE QUALITY-OF-LIFE ASSESSMENT WAS COMPLETED: 66954
RATE HOW BOTHERED YOU ARE BY SYMPTOMS OF YOUR SKIN PROBLEM (EG, ITCHING, STINGING BURNING, HURTING OR SKIN IRRITATION): 0 - NEVER BOTHERED

## 2024-04-24 ASSESSMENT — DERMATOLOGY PATIENT ASSESSMENT
ARE YOU AN ORGAN TRANSPLANT RECIPIENT: NO
DO YOU USE SUNSCREEN: OCCASIONALLY
DO YOU HAVE ANY NEW OR CHANGING LESIONS: NO
DO YOU USE A TANNING BED: NO
HAVE YOU HAD OR DO YOU HAVE VASCULAR DISEASE: NO
HAVE YOU HAD OR DO YOU HAVE A STAPH INFECTION: NO

## 2024-04-24 ASSESSMENT — PATIENT GLOBAL ASSESSMENT (PGA): PATIENT GLOBAL ASSESSMENT: PATIENT GLOBAL ASSESSMENT:  1 - CLEAR

## 2024-04-24 NOTE — PROGRESS NOTES
Subjective     Sebastian Dunham is a 28 y.o. male who presents for the following: Skin Check (No personal or family h/o skin cancers.  Pt has had moles removed in the past (biopsied).  Dry cracked webs of hands, thumb.).     Skin Cancer History  No skin cancer on file.    Review of Systems:  No other skin or systemic complaints other than what is documented elsewhere in the note.    The following portions of the chart were reviewed this encounter and updated as appropriate:       Specialty Problems    None    Past Medical History:  Sebastian Dunham  has a past medical history of Traumatic rupture of collateral ligament of left thumb (08/2023).    Past Surgical History:  Sebastian Dunham  has no past surgical history on file.    Family History:  Patient Family history is unknown by patient.    Social History:  Sebastian Dunham  reports that he has never smoked. He has never used smokeless tobacco. He reports current alcohol use of about 6.0 standard drinks of alcohol per week. He reports that he does not use drugs.    Allergies:  Patient has no known allergies.    Current Medications / CAM's:  No current outpatient medications on file.     Objective   Well appearing patient in no apparent distress; mood and affect are within normal limits.    A full examination was performed including scalp, head, eyes, ears, nose, lips, neck, chest, axillae, abdomen, back, buttocks, bilateral upper extremities, bilateral lower extremities, hands, feet, fingers, toes, fingernails, and toenails. All findings within normal limits unless otherwise noted below. Patient declined genital and gluteal cleft exam.      - scattered regular brown macules and papules    - Well healed scars at prior mole removal sites, some with recurrent pigmentation     Left Hand - Anterior, Right Hand - Anterior  Mild scaling with minimal erythema on thumbs, right palm         Assessment/Plan   Multiple benign melanocytic nevi    Benign melanocytic nevi- some clinically  dysplastic  - Discussed benign nature and that no treatment is necessary unless it becomes painful or increases in size. Patient opts for clinical monitoring at this time.    - Sun protective behavior reviewed and encouraged including the use of over-the-counter sunscreen with SPF30+ daily (reapply every 1.5 hours when outdoors), UPF clothing, broad rimmed hats, sunglasses, and avoidance of midday sun. Home skin monitoring encouraged and how to monitor for skin cancer (changing or new moles, new rapidly growing or non-healing lesions) reviewed. Patient encouraged to call with interval concerns or changes.      Related Procedures  Follow Up In Dermatology - Established Patient    Keratolysis exfoliativa (2)  Left Hand - Anterior; Right Hand - Anterior    Favor KE >> hand dermatitis  - nature reviewed  - reviewed Rx and OTC options, he opts for OTC treatment  - START Amlactin daily 12% lactic acid lotion 1-2x daily to hands  - if changes/worsens return for re-evaluation.     Related Procedures  Follow Up In Dermatology - Established Patient    Scar conditions and fibrosis of skin    - Well healed scar(s) at sites of melanocytic nevi shave biopsy sites on left upper back with repigmentation and coarse hairs (favor recurrent congential nevus); excision on right infra-auricular neck nevus is well healed without recurrence.   - Sun protective behavior reviewed and encouraged including the use of over-the-counter sunscreen with SPF30+ daily (reapply every 1.5 hours when outdoors), UPF clothing, broad rimmed hats, sunglasses, and avoidance of midday sun. Home skin monitoring encouraged and how to monitor for skin cancer (changing or new moles, new rapidly growing or non-healing lesions) reviewed. Patient encouraged to call with interval concerns or changes.          FUV 1 year FBSE   Brittany Andujar MD

## 2024-04-30 ENCOUNTER — TREATMENT (OUTPATIENT)
Dept: OCCUPATIONAL THERAPY | Facility: HOSPITAL | Age: 29
End: 2024-04-30
Payer: COMMERCIAL

## 2024-04-30 DIAGNOSIS — S63.642D RUPTURE OF RADIAL COLLATERAL LIGAMENT OF LEFT THUMB, SUBSEQUENT ENCOUNTER: ICD-10-CM

## 2024-04-30 PROCEDURE — 97035 APP MDLTY 1+ULTRASOUND EA 15: CPT | Mod: GO | Performed by: OCCUPATIONAL THERAPIST

## 2024-04-30 PROCEDURE — 97110 THERAPEUTIC EXERCISES: CPT | Mod: GO | Performed by: OCCUPATIONAL THERAPIST

## 2024-04-30 PROCEDURE — 97022 WHIRLPOOL THERAPY: CPT | Mod: GO | Performed by: OCCUPATIONAL THERAPIST

## 2024-04-30 PROCEDURE — 97140 MANUAL THERAPY 1/> REGIONS: CPT | Mod: GO | Performed by: OCCUPATIONAL THERAPIST

## 2024-04-30 ASSESSMENT — PAIN SCALES - GENERAL: PAINLEVEL_OUTOF10: 0 - NO PAIN

## 2024-04-30 ASSESSMENT — PAIN - FUNCTIONAL ASSESSMENT: PAIN_FUNCTIONAL_ASSESSMENT: 0-10

## 2024-04-30 NOTE — PROGRESS NOTES
Occupational Therapy  Occupational Therapy Treatment    Patient Name: Sebastian Dunham  MRN: [id number]  Today's Date: 4/30/2024  Time Calculation  Start Time: 0230  Stop Time: 0319  Time Calculation (min): 49 min    Insurance:  Visit number: 5 of 100  Insurance Type: [place]    General:  Reason for visit: s/p L thumb RCL repair  Referred by: Dr Chavez  DOS: 3/15/24    Current Problem  1. Rupture of radial collateral ligament of left thumb, subsequent encounter  Follow Up In Occupational Therapy              Precautions    RCL protocol    Subjective:   Patient reports he has been working on motion, progressing    Performing HEP?: Yes    Pain  Pain Assessment: 0-10  Pain Score: 0 - No pain  Location: L   Description: sore, diminished sensation    Objective:     Right Hand AROM (degrees)    MCP PIP DIP [place]   Thumb 55   55 Base of SF   Thumb RABD 55         Thumb PABD 55            Left Hand AROM (degrees)    MCP PIP DIP [place]   Thumb 50   50 base of SF   Thumb RABD 55         Thumb PABD 55            HAND STRENGTH (Lbs)   R L   Dynamometer  120 108   Lateral Pinch 25 defer   3jaw Pinch 27 defer           Edema: mild throughout thumb     Sensory: intact light touch  Numbness/Tingling: pt reports some diminished sensation dorsally/radially on thumb    Treatments:     Modalities:      18 min  X8 min continuous 3MHz @ 1.0w/cm2 to L thumb MPJ   X10 min fluiotherapy to L hand, 120 degrees F @ 70% air speed    Therex:    15 min  Beige powerewb pinch, lat pinch, digit ext 2x15 each  Theraputty ex reviewed - pinch, , rolls, digit ext, thumb flex  Manual Therapy:     16 min  STM, light passive CMC/thumb IP joint flex/ext, opposition       Assessment: Pt started with light resistance today with good tolerance.  He will continue to perform mobilization and light strengthening as tolerated.         Plan: Continue with progressive strengthening as tolerated    Sanket Baker, OT

## 2024-05-07 ENCOUNTER — TREATMENT (OUTPATIENT)
Dept: OCCUPATIONAL THERAPY | Facility: HOSPITAL | Age: 29
End: 2024-05-07
Payer: COMMERCIAL

## 2024-05-07 DIAGNOSIS — S63.642D RUPTURE OF RADIAL COLLATERAL LIGAMENT OF LEFT THUMB, SUBSEQUENT ENCOUNTER: Primary | ICD-10-CM

## 2024-05-07 PROCEDURE — 97110 THERAPEUTIC EXERCISES: CPT | Mod: GO | Performed by: OCCUPATIONAL THERAPIST

## 2024-05-07 PROCEDURE — 97140 MANUAL THERAPY 1/> REGIONS: CPT | Mod: GO | Performed by: OCCUPATIONAL THERAPIST

## 2024-05-07 PROCEDURE — 97035 APP MDLTY 1+ULTRASOUND EA 15: CPT | Mod: GO | Performed by: OCCUPATIONAL THERAPIST

## 2024-05-07 ASSESSMENT — PAIN - FUNCTIONAL ASSESSMENT: PAIN_FUNCTIONAL_ASSESSMENT: 0-10

## 2024-05-07 ASSESSMENT — PAIN SCALES - GENERAL: PAINLEVEL_OUTOF10: 0 - NO PAIN

## 2024-05-07 NOTE — PROGRESS NOTES
Occupational Therapy  Occupational Therapy Treatment    Patient Name: Sebastian Dunham  MRN: [id number]  Today's Date: 5/7/2024  Time Calculation  Start Time: 0230  Stop Time: 0320  Time Calculation (min): 50 min    Insurance:  Visit number: 6 of 100  Insurance Type: [place]    General:  Reason for visit: s/p L thumb RCL repair  Referred by: Dr Chavez  DOS: 3/15/24    Current Problem  1. Rupture of radial collateral ligament of left thumb, subsequent encounter            Precautions    RCL protocol    Subjective:   Patient reports he has been working on motion, progressing    Performing HEP?: Yes    Pain  Pain Assessment: 0-10  Pain Score: 0 - No pain  Location: L   Description: sore, diminished sensation    Objective:     Right Hand AROM (degrees)    MCP PIP DIP [place]   Thumb 55   55 Base of SF   Thumb RABD 55         Thumb PABD 55            Left Hand AROM (degrees)    MCP PIP DIP [place]   Thumb 50   55 base of SF   Thumb RABD 55         Thumb PABD 55            HAND STRENGTH (Lbs)   R L   Dynamometer  120 115   Lateral Pinch 25 13   3jaw Pinch 27 19         Sensory: intact light touch      Treatments:     Modalities:      18 min  X8 min continuous 3MHz @ 1.0w/cm2 to L thumb MPJ   X10 min fluiotherapy to L hand, 120 degrees F @ 70% air speed    Therex:    16 min   exerciser 3x10 reps  red powerweb tripod pinch, lat pinch 2x15 each  Green flexbar wrist flex/ext 2x10  Manual Therapy:     16 min  STM, light passive CMC/thumb IP joint flex/ext, opposition       Assessment: Pt progressing through resistance well, increased resistance of theraputty today for home program.      Plan: Continue with progressive strengthening as tolerated    Sanket Baker, OT

## 2024-05-14 ENCOUNTER — TREATMENT (OUTPATIENT)
Dept: OCCUPATIONAL THERAPY | Facility: HOSPITAL | Age: 29
End: 2024-05-14
Payer: COMMERCIAL

## 2024-05-14 DIAGNOSIS — S63.642D RUPTURE OF RADIAL COLLATERAL LIGAMENT OF LEFT THUMB, SUBSEQUENT ENCOUNTER: Primary | ICD-10-CM

## 2024-05-14 PROCEDURE — 97035 APP MDLTY 1+ULTRASOUND EA 15: CPT | Mod: GO | Performed by: OCCUPATIONAL THERAPIST

## 2024-05-14 PROCEDURE — 97140 MANUAL THERAPY 1/> REGIONS: CPT | Mod: GO | Performed by: OCCUPATIONAL THERAPIST

## 2024-05-14 PROCEDURE — 97110 THERAPEUTIC EXERCISES: CPT | Mod: GO | Performed by: OCCUPATIONAL THERAPIST

## 2024-05-14 NOTE — PROGRESS NOTES
Occupational Therapy  Occupational Therapy Treatment    Patient Name: Sebastian Dunham  MRN: [id number]  Today's Date: 5/14/2024  Time Calculation  Start Time: 0240  Stop Time: 0326  Time Calculation (min): 46 min    Insurance:  Visit number: 8 of 100  Insurance Type: [place]    General:  Reason for visit: s/p L thumb RCL repair  Referred by: Dr Chavez  DOS: 3/15/24    Current Problem  1. Rupture of radial collateral ligament of left thumb, subsequent encounter              Precautions    RCL protocol    Subjective:   Patient reports thumb has been doing well overall, pinch ex has been more comfortable.  Still feels some strain with thumb extension.    Performing HEP?: Yes    Pain     Location: L   Description: sore, diminished sensation    Objective:     Right Hand AROM (degrees)    MCP PIP DIP [place]   Thumb 55   55 Base of SF   Thumb RABD 55         Thumb PABD 55            Left Hand AROM (degrees)    MCP PIP DIP [place]   Thumb 50   55 base of SF   Thumb RABD 55         Thumb PABD 55            HAND STRENGTH (Lbs)   R L   Dynamometer  128 121   Lateral Pinch 25 17   3jaw Pinch 27 22         Sensory: intact light touch      Treatments:     Modalities:      16 min  X8 min continuous 3MHz @ 1.0w/cm2 to L thumb MPJ   X8 min fluiotherapy to L hand, 120 degrees F @ 70% air speed    Therex:    15 min  Blue powerweb , hook grasp, thumb flex 2x10 each  [employer] high pulley row, rev curl 2x10  Manual Therapy:     15 min  STM, light passive CMC/thumb IP joint flex/ext, opposition       Assessment: Pt continues to progress well.  Making improvements in strength appropriately. He is 8.5 weeks post op at this point.  He can start to increase his activity within his pain tolerance.      Plan: Continue with progressive strengthening as tolerated    Sanket Baker, OT

## 2024-05-23 ENCOUNTER — TREATMENT (OUTPATIENT)
Dept: OCCUPATIONAL THERAPY | Facility: HOSPITAL | Age: 29
End: 2024-05-23
Payer: COMMERCIAL

## 2024-05-23 DIAGNOSIS — S63.642D RUPTURE OF RADIAL COLLATERAL LIGAMENT OF LEFT THUMB, SUBSEQUENT ENCOUNTER: ICD-10-CM

## 2024-05-23 PROCEDURE — 97022 WHIRLPOOL THERAPY: CPT | Mod: GO | Performed by: OCCUPATIONAL THERAPIST

## 2024-05-23 PROCEDURE — 97035 APP MDLTY 1+ULTRASOUND EA 15: CPT | Mod: GO | Performed by: OCCUPATIONAL THERAPIST

## 2024-05-23 PROCEDURE — 97110 THERAPEUTIC EXERCISES: CPT | Mod: GO | Performed by: OCCUPATIONAL THERAPIST

## 2024-05-23 ASSESSMENT — PAIN - FUNCTIONAL ASSESSMENT: PAIN_FUNCTIONAL_ASSESSMENT: 0-10

## 2024-05-23 ASSESSMENT — PAIN SCALES - GENERAL: PAINLEVEL_OUTOF10: 0 - NO PAIN

## 2024-05-23 NOTE — PROGRESS NOTES
Occupational Therapy  Occupational Therapy Treatment    Patient Name: Sebastian Dunham  MRN: [id number]  Today's Date: 5/23/2024  Time Calculation  Start Time: 0130  Stop Time: 0216  Time Calculation (min): 46 min    Insurance:  Visit number: 8 of 100  Insurance Type: [place]    General:  Reason for visit: s/p L thumb RCL repair  Referred by: Dr Chavez  DOS: 3/15/24    Current Problem  1. Rupture of radial collateral ligament of left thumb, subsequent encounter  Follow Up In Occupational Therapy          Precautions    RCL protocol    Subjective:   Patient reports he has been able to return to pullup exercises and motions.     Performing HEP?: Yes    Pain  Pain Assessment: 0-10  Pain Score: 0 - No pain  Location: L   Description: sore, diminished sensation    Objective:     Right Hand AROM (degrees)    MCP PIP DIP [place]   Thumb 55   55 Base of [place]   Thumb RABD 55         Thumb PABD 55            Left Hand AROM (degrees)    MCP PIP DIP [place]   Thumb 50   55 base of SF   Thumb RABD 55         Thumb PABD 55            HAND STRENGTH (Lbs)   R L   Dynamometer  128 121   Lateral Pinch 25 17   3jaw Pinch 27 22         Sensory: intact light touch      Treatments:     Modalities:      26 min  X8 min continuous 3MHz @ 1.0w/cm2 to L thumb MPJ   X8 min fluiotherapy to L hand, 120 degrees F @ 70% air speed   X10 min themx cold/hot compression to L hand  34 degrees F @ 45 mmHg pressure    Therex:    15 min  Black powerweb thumb flex 2x15  Green powerweb thumb ext 2x15   exercier 3x15  Manual Therapy:     5 min  STM, light passive CMC/thumb IP joint flex/ext, opposition       Assessment: Pt has been working on exercises at home, he can continue to progress as he tolerates it.  He is x10 weeks post op now.  He does get a little soreness with certain /loading that isolate the thumb more however seems to be improving still.    Plan: Continue with progressive strengthening as tolerated    Sanket Baker, OT

## 2024-05-28 ENCOUNTER — TREATMENT (OUTPATIENT)
Dept: OCCUPATIONAL THERAPY | Facility: HOSPITAL | Age: 29
End: 2024-05-28
Payer: COMMERCIAL

## 2024-05-28 DIAGNOSIS — S63.642D RUPTURE OF RADIAL COLLATERAL LIGAMENT OF LEFT THUMB, SUBSEQUENT ENCOUNTER: ICD-10-CM

## 2024-05-28 PROCEDURE — 97035 APP MDLTY 1+ULTRASOUND EA 15: CPT | Mod: GO | Performed by: OCCUPATIONAL THERAPIST

## 2024-05-28 PROCEDURE — 97022 WHIRLPOOL THERAPY: CPT | Mod: GO | Performed by: OCCUPATIONAL THERAPIST

## 2024-05-28 ASSESSMENT — PAIN SCALES - GENERAL: PAINLEVEL_OUTOF10: 0 - NO PAIN

## 2024-05-28 ASSESSMENT — PAIN - FUNCTIONAL ASSESSMENT: PAIN_FUNCTIONAL_ASSESSMENT: 0-10

## 2024-05-28 NOTE — PROGRESS NOTES
Occupational Therapy  Occupational Therapy Treatment    Patient Name: Sebastian Dunham  MRN: [id number]  Today's Date: 5/28/2024  Time Calculation  Start Time: 0230  Stop Time: 0301  Time Calculation (min): 31 min    Insurance:  Visit number: 9 of 100  Insurance Type: [place]    General:  Reason for visit: s/p L thumb RCL repair  Referred by: Dr Chavez  DOS: 3/15/24    Current Problem  1. Rupture of radial collateral ligament of left thumb, subsequent encounter  Follow Up In Occupational Therapy          Precautions    RCL protocol    Subjective:   Patient reports he has been performing more lifting exercises, he still gets some occasional strain with activity however no real pain    Performing HEP?: Yes    Pain  Pain Assessment: 0-10  Pain Score: 0 - No pain  Location: L   Description: sore, diminished sensation    Objective:     Right Hand AROM (degrees)    MCP PIP DIP [place]   Thumb 55   55 Base of [place]   Thumb RABD 55         Thumb PABD 55            Left Hand AROM (degrees)    MCP PIP DIP [place]   Thumb 50   55 base of [place]   Thumb RABD 55         Thumb PABD 55            HAND STRENGTH (Lbs)   R L   Dynamometer  128 121   Lateral Pinch 25 19   3jaw Pinch 27 22       Sensory: intact light touch      Treatments:     Modalities:      26 min  X8 min continuous 3MHz @ 1.0w/cm2 to L thumb MPJ   X8 min fluiotherapy to L hand, 120 degrees F @ 70% air speed   X10 min themx cold/hot compression to L hand  34 degrees F @ 45 mmHg pressure     Manual Therapy:     5 min  STM, light passive CMC/thumb IP joint flex/ext, opposition       Assessment: Pt appears to continue to have more function overall.  He is returning to normal activity, noticing less area of diminished sensation around the scar and less sensitivity with use of the thumb and impact (I.e clapping) which used to cause sensations in his hand.  He will continue with his home program.    Plan: Continue with home progressive strengthening, pt instructed to reach out if any  issues arise going forward.  Discharge OT in 4 weeks.    Sanket Baker, OT

## 2025-04-23 ENCOUNTER — APPOINTMENT (OUTPATIENT)
Dept: DERMATOLOGY | Facility: CLINIC | Age: 30
End: 2025-04-23
Payer: COMMERCIAL

## (undated) DEVICE — SOLUTION, IRRIGATION, SODIUM CHLORIDE 0.9%, 1000 ML, POUR BOTTLE

## (undated) DEVICE — HOLSTER, BOVIE, ES PENCIL, DISP

## (undated) DEVICE — GOWN, SURGICAL, IMPLT, BACK, DISPOSABLE, XLARGE, STERILE

## (undated) DEVICE — SLING, ARM, LARGE

## (undated) DEVICE — PADDING, WEBRIL, UNDERCAST, STERILE, 4 IN

## (undated) DEVICE — PREP TRAY, SKIN, DRY, W/GLOVES

## (undated) DEVICE — BANDAGE, ELASTIC, ACE, W/CLIP, 3 IN X 5 YD, NS

## (undated) DEVICE — CUFF, TOURNIQUET, 18 X 4, SNGL PORT/SNGL BLADDER, DISP, LF

## (undated) DEVICE — SUTURE, VICRYL, 3-0, 27IN, RB-1

## (undated) DEVICE — STOCKINETTE, TUBE, BLN, ST, 1 PLY, 4 X 48 IN, LF

## (undated) DEVICE — SUTURE, ETHILON, 4-0, BLK, MONO, PS-2 18

## (undated) DEVICE — NEEDLE, SAFETY, 21 G X 1 IN